# Patient Record
(demographics unavailable — no encounter records)

---

## 2025-06-03 NOTE — ASSESSMENT
[FreeTextEntry1] : I have discussed with pt that although it is small, the fact that his polyp has increased in size is worrisome and I would therefore recommend he have an elective laparoscopic cholecystectomy with robotic assistance.  I have discussed the risks, benefits and options.

## 2025-06-03 NOTE — PHYSICAL EXAM
[JVD] : no jugular venous distention  [Normal Thyroid] : the thyroid was normal [Carotid Bruits] : no carotid bruits [Normal Breath Sounds] : Normal breath sounds [Normal Heart Sounds] : normal heart sounds [No Rash or Lesion] : No rash or lesion [Alert] : alert [Oriented to Person] : oriented to person [Oriented to Place] : oriented to place [Oriented to Time] : oriented to time [Calm] : calm [de-identified] : obese white male in no distress [de-identified] : non icteric [de-identified] : without adenopathy [de-identified] : normal bowel sounds, without distension or tenderness.  [de-identified] : without hernia [de-identified] : without calf pain or swelling

## 2025-06-03 NOTE — HISTORY OF PRESENT ILLNESS
[de-identified] : increasing size gallbladder polyp [de-identified] : 27year old white male who had an abdominal sonogram 2019  for abdominal bloating and was found to have a small gallbladder polyp (4mm). He now had a follow up sonogram that shows that the polyp has increased in size to 8 mm. He denies any symptoms; ie no fatty food intolerance, episodes of RUQ pain or episodes of Jaundice. He has no family history of gallbladder disease.